# Patient Record
Sex: MALE | Race: WHITE
[De-identification: names, ages, dates, MRNs, and addresses within clinical notes are randomized per-mention and may not be internally consistent; named-entity substitution may affect disease eponyms.]

---

## 2019-07-02 ENCOUNTER — HOSPITAL ENCOUNTER (EMERGENCY)
Dept: HOSPITAL 91 - FTE | Age: 19
Discharge: HOME | End: 2019-07-02
Payer: COMMERCIAL

## 2019-07-02 ENCOUNTER — HOSPITAL ENCOUNTER (EMERGENCY)
Dept: HOSPITAL 10 - FTE | Age: 19
Discharge: HOME | End: 2019-07-02
Payer: COMMERCIAL

## 2019-07-02 VITALS
HEIGHT: 71 IN | WEIGHT: 140.88 LBS | HEIGHT: 71 IN | BODY MASS INDEX: 19.72 KG/M2 | WEIGHT: 140.88 LBS | BODY MASS INDEX: 19.72 KG/M2

## 2019-07-02 VITALS — DIASTOLIC BLOOD PRESSURE: 54 MMHG | HEART RATE: 90 BPM | RESPIRATION RATE: 22 BRPM | SYSTOLIC BLOOD PRESSURE: 99 MMHG

## 2019-07-02 DIAGNOSIS — F10.99: ICD-10-CM

## 2019-07-02 DIAGNOSIS — R10.13: Primary | ICD-10-CM

## 2019-07-02 DIAGNOSIS — R11.10: ICD-10-CM

## 2019-07-02 LAB
ADD MAN DIFF?: NO
ADD UMIC: YES
ALANINE AMINOTRANSFERASE: 22 IU/L (ref 13–69)
ALBUMIN/GLOBULIN RATIO: 1.39
ALBUMIN: 5.3 G/DL (ref 3.3–4.9)
ALKALINE PHOSPHATASE: 64 IU/L (ref 42–121)
ANION GAP: 17 (ref 5–13)
ASPARTATE AMINO TRANSFERASE: 27 IU/L (ref 15–46)
BASOPHIL #: 0 10^3/UL (ref 0–0.1)
BASOPHILS %: 0.2 % (ref 0–2)
BILIRUBIN,DIRECT: 0 MG/DL (ref 0–0.2)
BILIRUBIN,TOTAL: 0.7 MG/DL (ref 0.2–1.3)
BLOOD UREA NITROGEN: 15 MG/DL (ref 7–20)
CALCIUM: 9.9 MG/DL (ref 8.4–10.2)
CARBON DIOXIDE: 24 MMOL/L (ref 21–31)
CHLORIDE: 103 MMOL/L (ref 97–110)
CREATININE: 0.76 MG/DL (ref 0.61–1.24)
EOSINOPHILS #: 0 10^3/UL (ref 0–0.5)
EOSINOPHILS %: 0 % (ref 0–7)
GLOBULIN: 3.8 G/DL (ref 1.3–3.2)
GLUCOSE: 135 MG/DL (ref 70–220)
HEMATOCRIT: 45.7 % (ref 42–52)
HEMOGLOBIN: 15.6 G/DL (ref 14–18)
IMMATURE GRANS #M: 0.08 10^3/UL (ref 0–0.03)
IMMATURE GRANS % (M): 0.5 % (ref 0–0.43)
LIPASE: 25 U/L (ref 23–300)
LYMPHOCYTES #: 0.6 10^3/UL (ref 0.8–2.9)
LYMPHOCYTES %: 4 % (ref 15–51)
MEAN CORPUSCULAR HEMOGLOBIN: 30.1 PG (ref 29–33)
MEAN CORPUSCULAR HGB CONC: 34.1 G/DL (ref 32–37)
MEAN CORPUSCULAR VOLUME: 88.2 FL (ref 82–101)
MEAN PLATELET VOLUME: 9.6 FL (ref 7.4–10.4)
MONOCYTE #: 0.3 10^3/UL (ref 0.3–0.9)
MONOCYTES %: 1.7 % (ref 0–11)
NEUTROPHIL #: 14.4 10^3/UL (ref 1.6–7.5)
NEUTROPHILS %: 93.6 % (ref 39–77)
NUCLEATED RED BLOOD CELLS #: 0 10^3/UL (ref 0–0)
NUCLEATED RED BLOOD CELLS%: 0 /100WBC (ref 0–0)
PLATELET COUNT: 320 10^3/UL (ref 140–415)
POTASSIUM: 3.9 MMOL/L (ref 3.5–5.1)
RED BLOOD COUNT: 5.18 10^6/UL (ref 4.7–6.1)
RED CELL DISTRIBUTION WIDTH: 13 % (ref 11.5–14.5)
SODIUM: 144 MMOL/L (ref 135–144)
TOTAL PROTEIN: 9.1 G/DL (ref 6.1–8.1)
UR ASCORBIC ACID: NEGATIVE MG/DL
UR BILIRUBIN (DIP): NEGATIVE MG/DL
UR BLOOD (DIP): NEGATIVE MG/DL
UR CLARITY: (no result)
UR COLOR: YELLOW
UR GLUCOSE (DIP): NEGATIVE MG/DL
UR KETONES (DIP): (no result) MG/DL
UR LEUKOCYTE ESTERASE (DIP): (no result) LEU/UL
UR MUCUS: (no result) /HPF
UR NITRITE (DIP): NEGATIVE MG/DL
UR PH (DIP): 5 (ref 5–9)
UR RBC: 4 /HPF (ref 0–5)
UR SPECIFIC GRAVITY (DIP): 1.03 (ref 1–1.03)
UR TOTAL PROTEIN (DIP): (no result) MG/DL
UR UROBILINOGEN (DIP): NEGATIVE MG/DL
UR WBC: 14 /HPF (ref 0–5)
WHITE BLOOD COUNT: 15.4 10^3/UL (ref 4.8–10.8)

## 2019-07-02 PROCEDURE — 85025 COMPLETE CBC W/AUTO DIFF WBC: CPT

## 2019-07-02 PROCEDURE — 74176 CT ABD & PELVIS W/O CONTRAST: CPT

## 2019-07-02 PROCEDURE — 96361 HYDRATE IV INFUSION ADD-ON: CPT

## 2019-07-02 PROCEDURE — 76705 ECHO EXAM OF ABDOMEN: CPT

## 2019-07-02 PROCEDURE — 36415 COLL VENOUS BLD VENIPUNCTURE: CPT

## 2019-07-02 PROCEDURE — 83690 ASSAY OF LIPASE: CPT

## 2019-07-02 PROCEDURE — 81001 URINALYSIS AUTO W/SCOPE: CPT

## 2019-07-02 PROCEDURE — 96375 TX/PRO/DX INJ NEW DRUG ADDON: CPT

## 2019-07-02 PROCEDURE — 96374 THER/PROPH/DIAG INJ IV PUSH: CPT

## 2019-07-02 PROCEDURE — 80053 COMPREHEN METABOLIC PANEL: CPT

## 2019-07-02 PROCEDURE — 99285 EMERGENCY DEPT VISIT HI MDM: CPT

## 2019-07-02 RX ADMIN — THIAMINE HYDROCHLORIDE 1 MLS/HR: 100 INJECTION, SOLUTION INTRAMUSCULAR; INTRAVENOUS at 18:52

## 2019-07-02 RX ADMIN — METOCLOPRAMIDE HYDROCHLORIDE 1 MG: 10 INJECTION, SOLUTION INTRAMUSCULAR; INTRAVENOUS at 20:45

## 2019-07-02 RX ADMIN — KETOROLAC TROMETHAMINE 1 MG: 30 INJECTION, SOLUTION INTRAMUSCULAR at 18:51

## 2019-07-02 RX ADMIN — ONDANSETRON HYDROCHLORIDE 1 MG: 2 INJECTION, SOLUTION INTRAMUSCULAR; INTRAVENOUS at 18:51

## 2019-07-02 NOTE — ERD
ER Documentation


Chief Complaint


Chief Complaint





vomiting since last night w/ AP





HPI


Patient is a 19-year-old male, past surgical history of appendectomy, presents 


the ER for concerns of abdominal pain and vomiting which started last night.   


Patient states the pain is localized to the epigastric and right upper quadrant.


 Patient denies any radiation of pain.  Patient states he has been vomiting 


throughout the day, nonbloody, nonbilious, unable to tolerate p.o. fluids. 


Patient states he has been vomiting 20 times.  Patient denies any chest pain or 


shortness of breath.  Patient denies any fever or chills, lower abdominal pain, 


dysuria, frequency, urgency or hematuria.  Patient denies any recent alcohol 


use.  No recent travel.  No sick contacts.





ROS


All systems reviewed and are negative except as per history of present illness.





Medications


Home Meds


Active Scripts


Acetaminophen* (Tylophen*) 500 Mg Capsule, 1 CAP PO Q6H PRN for PAIN AND OR 


ELEVATED TEMP, #20 CAP


   Prov:SAMREEN NJ PA-C         19


Ondansetron (Ondansetron Odt) 4 Mg Tab.rapdis, 4 MG PO Q6H PRN for NAUSEA AND/OR


VOMITING, #10 TAB


   Prov:SAMREEN NJ PA-C         19





Allergies


Allergies:  


Coded Allergies:  


     No Known Allergy (Unverified , 19)





PMhx/Soc


History of Surgery:  Yes (appendectomy)


Hx Alcohol Use:  Yes


Hx Substance Use:  No


Hx Tobacco Use:  No





FmHx


Family History:  No diabetes





Physical Exam


Vitals





Vital Signs


  Date      Temp  Pulse  Resp  B/P (MAP)   Pulse Ox  O2          O2 Flow    FiO2


Time                                                 Delivery    Rate


    19  97.9     76    16      146/64       100


     17:55                           (91)





Physical Exam


GENERAL: Well-developed, well-nourished male. Appears in no acute distress. 


HEAD: Normocephalic, atraumatic. 


EYES: Pupils are equally reactive bilaterally. EOMs grossly intact. No 


conjunctival erythema. 


ENT: Moist mucous membranes. No uvula deviation. No kissing tonsils. 


NECK: Supple. No meningismus. Normal range of motion of the neck.


LUNG: Clear to auscultation bilaterally. No rhonchi, wheezing, rales or coarse 


breath sounds. 


HEART: Regular rate and rhythm. No murmurs, rubs or gallops.


ABDOMEN: Soft, nondistended.  Tender to palpation in the right upper quadrant 


and epigastric region. Positive bowel sounds in all four quadrants. No rebound 


tenderness, no guarding. (-) McBurney's point tenderness. No CVA tenderness.


EXTREMITIES: Equal pulses bilaterally. No peripheral clubbing, cyanosis or 


edema. No unilateral leg swelling.


NEUROLOGIC: Alert and oriented. Moving all four extremities without any 


difficulty. Normal speech. Steady gait. 


SKIN: Normal color. Warm and dry. No rashes or lesions.


Result Diagram:  


197                                                                     


          19





Results 24 hrs





Laboratory Tests


              Test
                                   19
18:47


              White Blood Count                      15.4 10^3/ul


              Red Blood Count                        5.18 10^6/ul


              Hemoglobin                                15.6 g/dl


              Hematocrit                                   45.7 %


              Mean Corpuscular Volume                     88.2 fl


              Mean Corpuscular Hemoglobin                 30.1 pg


              Mean Corpuscular Hemoglobin
Concent      34.1 g/dl 



              Red Cell Distribution Width                  13.0 %


              Platelet Count                          320 10^3/UL


              Mean Platelet Volume                         9.6 fl


              Immature Granulocytes %                     0.500 %


              Neutrophils %                                93.6 %


              Lymphocytes %                                 4.0 %


              Monocytes %                                   1.7 %


              Eosinophils %                                 0.0 %


              Basophils %                                   0.2 %


              Nucleated Red Blood Cells %             0.0 /100WBC


              Immature Granulocytes #               0.080 10^3/ul


              Neutrophils #                          14.4 10^3/ul


              Lymphocytes #                           0.6 10^3/ul


              Monocytes #                             0.3 10^3/ul


              Eosinophils #                           0.0 10^3/ul


              Basophils #                             0.0 10^3/ul


              Nucleated Red Blood Cells #             0.0 10^3/ul


              Urine Color                          YELLOW


              Urine Clarity
                       SLIGHTLY
CLOUDY


              Urine pH                                        5.0


              Urine Specific Gravity                        1.031


              Urine Ketones                              2+ mg/dL


              Urine Nitrite                        NEGATIVE mg/dL


              Urine Bilirubin                      NEGATIVE mg/dL


              Urine Urobilinogen                   NEGATIVE mg/dL


              Urine Leukocyte Esterase             TRACE Zita/ul


              Urine Microscopic RBC                        4 /HPF


              Urine Microscopic WBC                       14 /HPF


              Urine Mucus                          FEW /HPF


              Urine Hemoglobin                     NEGATIVE mg/dL


              Urine Glucose                        NEGATIVE mg/dL


              Urine Total Protein                        2+ mg/dl


              Sodium Level                             144 mmol/L


              Potassium Level                          3.9 mmol/L


              Chloride Level                           103 mmol/L


              Carbon Dioxide Level                      24 mmol/L


              Anion Gap                                        17


              Blood Urea Nitrogen                        15 mg/dl


              Creatinine                               0.76 mg/dl


              Est Glomerular Filtrat Rate
mL/min   > 60 mL/min 



              Glucose Level                             135 mg/dl


              Calcium Level                             9.9 mg/dl


              Total Bilirubin                           0.7 mg/dl


              Direct Bilirubin                         0.00 mg/dl


              Indirect Bilirubin                        0.7 mg/dl


              Aspartate Amino Transf
(AST/SGOT)          27 IU/L 



              Alanine Aminotransferase
(ALT/SGPT)        22 IU/L 



              Alkaline Phosphatase                        64 IU/L


              Total Protein                              9.1 g/dl


              Albumin                                    5.3 g/dl


              Globulin                                  3.80 g/dl


              Albumin/Globulin Ratio                         1.39


              Lipase                                       25 U/L





Current Medications


 Medications
   Dose
          Sig/Anamika
       Start Time
   Status  Last


 (Trade)       Ordered        Route
 PRN     Stop Time              Admin
Dose


                              Reason                                Admin


 Sodium         1,000 ml @ 
   Q1H STAT
      19        DC            19


Chloride       1,000 mls/hr   IV
            18:30
 19                18:52



                                             19:29


 Ondansetron    4 mg           ONCE  STAT
    19        DC            19


HCl
  (Zofran                 IV
            18:30
 19                18:51



Inj)                                         18:32


 Ketorolac
     30 mg          ONCE  STAT
    19        DC            19


Tromethamine
                 IV
            18:30
 19                18:51



 (Toradol)                                   18:32


                5 mg           ONCE  ONCE
    19        DC            19


Metoclopramid                 IV
            21:00
 19                20:45



e HCl
                                       21:01


(Reglan)








Procedures/MDM


ED COURSE:


The patient was stable throughout ED course. I kept the patient and/or family 


informed of laboratory and diagnostic imaging results throughout the ED course. 








 


DIAGNOSTIC IMAGING:


Read by radiologist.


                            DIAGNOSTIC IMAGING REPORT





Patient: MICKY CONNELL   : 2000   Age: 19  Sex: M                        


       MR #:    [id number]   [id number]    DOS: 19 194


Ordering MD: SAMREEN NJ PA-C   Location:  [facility]   Room/Bed:                   


                        


                                        


PROCEDURE:   CT Abdomen and Pelvis without contrast. 


 


CLINICAL INDICATION:   Abdominal pain and vomiting. 


 


TECHNIQUE:   Volumetrically-acquired images of the abdomen and pelvis were 


obtained without the administration of intravenous contrast. Images were then 


reformatted in the axial, sagittal, and coronal planes.  DICOM images are 


available. CTDIvol (mGy):  6.10;  Total Exam DLP (mGy-cm):  344.26.


 


One or more of the following dose reduction techniques were utilized:


 


-  Automated exposure control.


-  Adjustment of the mA and/or kV according to patient size.


-  Use of iterative reconstruction technique.


 


COMPARISON:   Gallbladder ultrasound 2019. 


 


FINDINGS:


 


Lower thorax: Unremarkable.


 


Liver: Unremarkable noncontrast appearance. 


 


Biliary: Normal gallbladder. No biliary dilatation.


 


Spleen: Normal.


 


Pancreas: Unremarkable noncontrast appearance.


 


Adrenal Glands: Normal.


 


Urinary: The kidneys are symmetric in size. There are no nephroureteral stones. 


There is no hydronephrosis. There is no perinephric inflammation. The bladder is


unremarkable.


 


Gastrointestinal: The stomach is collapsed. The small and large intestines are 


unremarkable. The appendix is not seen.


 


Lymph nodes: No lymphadenopathy. Scattered tiny mesenteric lymph nodes are 


observed. 


 


Vascular: The abdominal aorta is normal in caliber. 


 


Peritoneum/mesentery: No free fluid or free air.


 


Reproductive organs: The prostate gland and seminal vesicles are unremarkable.


 


Musculoskeletal: Unremarkable.


 


Additional comments: None.


 


 


IMPRESSION:


 


No evidence of abdominopelvic mass, lymphadenopathy or acute inflammatory 


pathology.


 


  


RPTAT:  [facility]


_____________________________________________ 


.Rachel Martines MD, MD           Date    Time 


Electronically viewed and signed by .Rachel Martines MD, MD on 2019 20:45 


 


D:  2019 20:45  T:  2019 20:45


.T/





CC: SAMREEN NJ PA-C





[phone]





                            DIAGNOSTIC IMAGING REPORT





Patient: MICKY CONNELL   : 2000   Age: 19  Sex: M                        


       MR #:    [id number]   Acct #:   [id number]    DOS: 19 1830


Ordering MD: SAMREEN NJ PA-C   Location:  [facility]   Room/Bed:                   


                        


                                        


PROCEDURE:   Right upper quadrant ultrasound 


 


CLINICAL INDICATION:   Abdominal pain 


 


TECHNIQUE:   Multiple real-time images were acquired of the patient's abdomen 


and right retroperitoneum utilizing a high resolution transducer. 


 


COMPARISON:   None 


 


FINDINGS:


The liver is normal in echogenicity and measures 15.6 cm.  No focal hepatic 


masses are seen.  The gallbladder is physiologically distended.  There is no 


evidence of gallstones, gallbladder wall thickening, or pericholecystic fluid.  


The intra and extrahepatic bile ducts are normal in caliber.  The common bile 


duct measures 1.9 mm.


 


Midline images demonstrate the pancreas to be normal in echogenicity without 


obvious inflammatory change.


 


Survey views of the right kidney demonstrate no evidence of hydronephrosis or 


renal calculi.  The right kidney measures 10.5 cm.  


 


IMPRESSION:


Unremarkable right upper quadrant ultrasound.  No evidence of cholelithiasis or 


acute cholecystitis.. 


 


 


RPTAT: HH


_____________________________________________ 


.Mono Meade MD, MD           Date    Time 


Electronically viewed and signed by .Mono Meade MD, MD on 2019 19:21 


 


D:  2019 19:21  T:  2019 19:21


.W/





CC: SAMREEN NJ PA-C





[phone]


 





PROCEDURES:


None.





MEDICATIONS GIVEN: 


IV fluids, Zofran, Toradol, Reglan


Patient tolerated medication well with no adverse reactions. Patient reported 


improvement in pain. 








MEDICAL DECISION MAKING:


This is a 19-year-old male presents the ER for concerns of epigastric, right 


upper quadrant abdominal pain as well as vomiting x1 day.. Vital signs were 


reviewed. Patient is afebrile.  Patient was not hypoxic.





Abdominal exam did reveal tenderness palpation epigastric region.  IV line was 


established.  Blood work was obtained.  CBC showed WC count of 15.4.  Likely 


reactive.  No evidence of anemia.


CBC showed no evidence of systemic infection or severe anemia. 


CMP showed no evidence of electrolyte abnormalities, severe acidosis, alkalosis,


renal failure, or liver disease.


Lipase showed no evidence of acute pancreatitis. 


UA did show 2+ ketones, trace leukocyte esterase.  Patient denies any dysuria, 


frequency, urgency or hematuria.  No indication for UTI treatment at this time.





Gallbladder ultrasound was unremarkable.  CT abdomen pelvis is unremarkable.  


See formal report above.  Upon reexamination, patient symptoms were improved.





Upon reexamination, patient continued to feel nauseous.  At that time Reglan was


given.  Patient was stated at this time that he was drinking last night.  


Symptoms are likely due to alcohol use.





Low suspicion for acute coronary syndrome, AAA, mesenteric ischemia, lower lobe 


pneumonia, DKA, bowel perforation, cholecystitis, choledocholithiasis, ascending


cholangitis, hepatic abscess, pancreatitis, PUD, gastritis, GERD, splenic 


rupture, diverticulitis, UTI, pyelonephritis, nephrolithiasis, appendicitis, 


constipation, testicular torsion, epididymitis, urethritis, or prostatitis.





PRESCRIPTIONS: 


Zofran, Tylenol





DISCHARGE: 


At this time, patient is stable for discharge and outpatient management. I have 


instructed the patient to follow-up with his/her primary care physician in 1-2 


days. I have instructed the patient to promptly return to the ER at any time for


any new or worsening symptoms including increased pain, nausea, vomiting, 


diarrhea, fever, weakness or LOC. The patient and/or family expressed 


understanding of and agreement with this plan. All questions were answered. Home


care instructions were provided. 





Disclaimer: Inadvertent spelling and grammatical errors are likely due to E


HR/dictation software use and do not reflect on the overall quality of patient 


care. Also, please note that the electronic time recorded on this note does not 


necessarily reflect the actual time of the patient encounter.





Departure


Diagnosis:  


   Primary Impression:  


   Epigastric pain


   Additional Impressions:  


   Vomiting


   Vomiting type:  unspecified  Vomiting Intractability:  unspecified  Nausea 


   presence:  unspecified  Qualified Codes:  R11.10 - Vomiting, unspecified


   Alcohol use


Condition:  Fair


Patient Instructions:  Vomiting (6Y-Adult), Epigastric Pain (Uncertain Cause)





Additional Instructions:  


Call your primary care doctor TOMORROW for an appointment during the next 1-2 


days.See the doctor sooner or return here if your condition worsens before your 


appointment time.











SAMREEN NJ PA-C              2019 18:34

## 2019-09-16 ENCOUNTER — HOSPITAL ENCOUNTER (EMERGENCY)
Dept: HOSPITAL 91 - FTE | Age: 19
Discharge: HOME | End: 2019-09-16
Payer: SELF-PAY

## 2019-09-16 ENCOUNTER — HOSPITAL ENCOUNTER (EMERGENCY)
Dept: HOSPITAL 10 - FTE | Age: 19
Discharge: HOME | End: 2019-09-16
Payer: SELF-PAY

## 2019-09-16 VITALS
WEIGHT: 136.91 LBS | BODY MASS INDEX: 19.17 KG/M2 | HEIGHT: 71 IN | HEIGHT: 71 IN | WEIGHT: 136.91 LBS | BODY MASS INDEX: 19.17 KG/M2

## 2019-09-16 VITALS — RESPIRATION RATE: 18 BRPM | HEART RATE: 59 BPM | SYSTOLIC BLOOD PRESSURE: 132 MMHG | DIASTOLIC BLOOD PRESSURE: 61 MMHG

## 2019-09-16 DIAGNOSIS — R19.7: ICD-10-CM

## 2019-09-16 DIAGNOSIS — R11.2: Primary | ICD-10-CM

## 2019-09-16 PROCEDURE — 99283 EMERGENCY DEPT VISIT LOW MDM: CPT

## 2019-09-16 RX ADMIN — ONDANSETRON 1 MG: 4 TABLET, ORALLY DISINTEGRATING ORAL at 10:04

## 2019-09-16 RX ADMIN — ALUMINUM HYDROXIDE, MAGNESIUM HYDROXIDE, DIMETHICONE 1 ML: 200; 200; 20 SUSPENSION ORAL at 10:04
